# Patient Record
Sex: FEMALE | URBAN - METROPOLITAN AREA
[De-identification: names, ages, dates, MRNs, and addresses within clinical notes are randomized per-mention and may not be internally consistent; named-entity substitution may affect disease eponyms.]

---

## 2021-10-04 ENCOUNTER — OFFICE VISIT (OUTPATIENT)
Dept: URBAN - METROPOLITAN AREA CLINIC 82 | Facility: CLINIC | Age: 61
End: 2021-10-04

## 2022-08-30 ENCOUNTER — OFFICE VISIT (OUTPATIENT)
Dept: URBAN - METROPOLITAN AREA CLINIC 35 | Facility: CLINIC | Age: 62
End: 2022-08-30
Payer: SELF-PAY

## 2022-08-30 ENCOUNTER — LAB OUTSIDE AN ENCOUNTER (OUTPATIENT)
Dept: URBAN - METROPOLITAN AREA CLINIC 35 | Facility: CLINIC | Age: 62
End: 2022-08-30

## 2022-08-30 VITALS
DIASTOLIC BLOOD PRESSURE: 66 MMHG | WEIGHT: 165 LBS | HEART RATE: 67 BPM | BODY MASS INDEX: 30.36 KG/M2 | HEIGHT: 62 IN | SYSTOLIC BLOOD PRESSURE: 163 MMHG | OXYGEN SATURATION: 94 %

## 2022-08-30 DIAGNOSIS — Z12.11 COLON CANCER SCREENING: ICD-10-CM

## 2022-08-30 DIAGNOSIS — R10.13 EPIGASTRIC ABDOMINAL PAIN: ICD-10-CM

## 2022-08-30 DIAGNOSIS — R14.2 BELCHING SYMPTOM: ICD-10-CM

## 2022-08-30 DIAGNOSIS — R14.0 ABDOMINAL BLOATING: ICD-10-CM

## 2022-08-30 DIAGNOSIS — R12 HEARTBURN: ICD-10-CM

## 2022-08-30 PROCEDURE — 99204 OFFICE O/P NEW MOD 45 MIN: CPT | Performed by: INTERNAL MEDICINE

## 2022-08-30 RX ORDER — SODIUM, POTASSIUM,MAG SULFATES 17.5-3.13G
177 ML SOLUTION, RECONSTITUTED, ORAL ORAL BID
Qty: 354 ML | Refills: 0 | OUTPATIENT
Start: 2022-08-30 | End: 2022-08-31

## 2022-08-30 NOTE — HPI-TODAY'S VISIT:
62 year old female patient presents for epigastric pain, indigestion, gas and bloating. Patient admits epigastric pain can be described as "contractions". Pain started 1 year ago and the patient states that she has 1 episode per month. Patient admits any aggravating factors: she states the epigastric pain may be related to irregular eating schedule. Patient admits alleviating factor: eating, she states she feels relief after a meal. Patient has tried Cimetidine 200 mg  with relief of her symptoms. Patient admits the use of antibiotics within the last (3-6) months. She admits taking Azithromycin and Amoxicillin to treat cold symptoms.  GI MD: epigastric pain she thinks may be present for over 1 year. Bread, grains, red meat are some exacerbating factors. Pain though, is not always associated with food though. + heartburn No dysphagia No N/V Patient states approximately 3 months ago was found to have H Pylori on a breath test and treated with Amoxicillin and Clarithromycin. Eradication was confirmed, per patient's hx.

## 2022-08-30 NOTE — HPI-BLOATING/GAS
62 year old female patient presents for epigastric pain, indigestions, gas and bloating. Patient admits breads, pasta, red meats, and grains are aggravating factors. She admits acidic eruptions accompanied by frequent episodes of acid reflux. She admits taking over the counter anti-acid with mild relief. She admits modifying diet and eating lighter foods which have improved symptoms. Patient admits episodes happen very frequently.  GI MD:  increased belching and heartburn. Patient has  noticed when she is under stress, symptoms are magnified. No increased flatus. She has daily BM's. No constipation. No melena or visible blood in stool. No weight loss

## 2022-09-30 ENCOUNTER — TELEPHONE ENCOUNTER (OUTPATIENT)
Dept: URBAN - METROPOLITAN AREA CLINIC 6 | Facility: CLINIC | Age: 62
End: 2022-09-30

## 2022-09-30 RX ORDER — SODIUM, POTASSIUM,MAG SULFATES 17.5-3.13G
177 ML SOLUTION, RECONSTITUTED, ORAL ORAL BID
Qty: 354 ML | Refills: 0 | OUTPATIENT
Start: 2022-10-04 | End: 2022-10-08

## 2022-10-21 ENCOUNTER — OFFICE VISIT (OUTPATIENT)
Dept: URBAN - METROPOLITAN AREA MEDICAL CENTER 10 | Facility: MEDICAL CENTER | Age: 62
End: 2022-10-21

## 2022-10-21 ENCOUNTER — CLAIMS CREATED FROM THE CLAIM WINDOW (OUTPATIENT)
Dept: URBAN - METROPOLITAN AREA MEDICAL CENTER 10 | Facility: MEDICAL CENTER | Age: 62
End: 2022-10-21
Payer: SELF-PAY

## 2022-10-21 DIAGNOSIS — K63.89 BACTERIAL OVERGROWTH SYNDROME: ICD-10-CM

## 2022-10-21 DIAGNOSIS — K29.60 ADENOPAPILLOMATOSIS GASTRICA: ICD-10-CM

## 2022-10-21 DIAGNOSIS — Z12.11 COLON CANCER SCREENING: ICD-10-CM

## 2022-10-21 PROCEDURE — 43239 EGD BIOPSY SINGLE/MULTIPLE: CPT | Performed by: INTERNAL MEDICINE

## 2022-10-21 PROCEDURE — 45380 COLONOSCOPY AND BIOPSY: CPT | Performed by: INTERNAL MEDICINE

## 2022-11-08 ENCOUNTER — OFFICE VISIT (OUTPATIENT)
Dept: URBAN - METROPOLITAN AREA CLINIC 35 | Facility: CLINIC | Age: 62
End: 2022-11-08
Payer: SELF-PAY

## 2022-11-08 ENCOUNTER — DASHBOARD ENCOUNTERS (OUTPATIENT)
Age: 62
End: 2022-11-08

## 2022-11-08 VITALS
WEIGHT: 164 LBS | OXYGEN SATURATION: 97 % | DIASTOLIC BLOOD PRESSURE: 74 MMHG | SYSTOLIC BLOOD PRESSURE: 110 MMHG | BODY MASS INDEX: 30.18 KG/M2 | HEIGHT: 62 IN | HEART RATE: 65 BPM

## 2022-11-08 DIAGNOSIS — R14.0 ABDOMINAL BLOATING: ICD-10-CM

## 2022-11-08 DIAGNOSIS — Z12.11 COLON CANCER SCREENING: ICD-10-CM

## 2022-11-08 DIAGNOSIS — R10.13 EPIGASTRIC ABDOMINAL PAIN: ICD-10-CM

## 2022-11-08 DIAGNOSIS — R12 HEARTBURN: ICD-10-CM

## 2022-11-08 PROCEDURE — 99214 OFFICE O/P EST MOD 30 MIN: CPT | Performed by: INTERNAL MEDICINE

## 2022-11-08 RX ORDER — FAMOTIDINE 40 MG/1
1 TABLET AT BEDTIME TABLET, FILM COATED ORAL ONCE A DAY
Qty: 30 | Refills: 0 | OUTPATIENT
Start: 2022-11-08

## 2022-11-08 RX ORDER — PANTOPRAZOLE SODIUM 40 MG/1
1 TABLET TABLET, DELAYED RELEASE ORAL
Qty: 90 | Refills: 0 | OUTPATIENT
Start: 2022-11-08

## 2022-11-08 NOTE — HPI-TODAY'S VISIT:
62 Year old female patient presents today for Colonoscopy and EGD follow up. She denies any complication after procedure. Currently reports one bowel movements a day without strain. Stools are formed. Denies the presence of blood, mucus, nor melena. Denies dysphagia, globus, loss in apetite, sudden weight loss, change in bowel habits.  Colonoscopy Impression:  -The examined portion of the ileum was normal. -One 2 mm polyp in the sigmoid colon, removed with a cold biopsy forceps. Resected and retrieved. - Non-bleeding internal hemorrhoids.   Endoscopy Impressions:  -Z-line regular, 35 cm from the incisors. -Normal esophagus. -Erythematous mucosa in the antrum. Biopsied. -Gastric mucosal atrohpy. Biopsied. -Normal examined duodenum. Biopsied  Path: Duodenal bxs: NL Gastric: chronic gastritis. No atrophy. H Pylori immunostain negative Sigmoid colon polyp: HP   GI MD: patient is still having issues with heartburn. She is finding out several food items that bother like bread, flours, milk. No N/V NL BM's +bloating

## 2022-11-08 NOTE — PHYSICAL EXAM CHEST:
Refill Authorization Note   Anne Farmer  is requesting a refill authorization.  Brief Assessment and Rationale for Refill:  Approve     Medication Therapy Plan:       Medication Reconciliation Completed: No   Comments:   --->Care Gap information included below if applicable.       Requested Prescriptions   Pending Prescriptions Disp Refills    amLODIPine (NORVASC) 10 MG tablet [Pharmacy Med Name: AMLODIPINE BESYLATE 10 MG Tablet] 90 tablet 0     Sig: TAKE 1 TABLET EVERY DAY       Calcium-Channel Blockers Protocol Passed - 3/3/2022  5:38 AM        Passed - Patient is not currently pregnant        Passed - No positive pregnancy test in past 12 months         Passed - Visit with authorizing provider in past 12 months or upcoming 90 days         Passed - Blood Pressure below 139/89 on file in past 12 months      BP Readings from Last 3 Encounters:   12/01/21 138/65   11/01/21 (!) 140/90   07/29/21 (!) 145/70            Cardiovascular:  Calcium Channel Blockers Passed - 3/9/2022  9:21 PM        Passed - Patient is at least 18 years old        Passed - Last BP in normal range within 360 days     BP Readings from Last 1 Encounters:   12/01/21 138/65               Passed - Valid encounter within last 15 months     Recent Visits  Date Type Provider Dept   05/13/21 Office Visit Chiquita Talley MD [facility]   09/23/20 Office Visit Chiquita Talley MD [facility]   Showing recent visits within past 720 days and meeting all other requirements  Future Appointments  No visits were found meeting these conditions.  Showing future appointments within next 150 days and meeting all other requirements      Future Appointments              [facility]    In 3 months MD [facility] -  Rheumatology, [facility]                  pravastatin (PRAVACHOL) 20 MG tablet [Pharmacy Med Name: PRAVASTATIN SODIUM 20 MG Tablet] 90  chest wall non-tender, breathing is unlabored without accessory muscle use, normal breath sounds tablet 0     Sig: TAKE 1 TABLET EVERY DAY       Hmg CoA Reductase Inhibitors Protocol Passed - 3/3/2022  5:38 AM        Passed - Patient not currently pregnant        Passed - No positive pregnancy test in past 12 months         Passed - Recent or future visit with authorizing provider        Passed - Lipid Panel within past 12 months     Lab Results   Component Value Date    CHOL 198 05/13/2021    HDL 86 (H) 05/13/2021    LDLCALC 81.6 05/13/2021    TRIG 152 (H) 05/13/2021             Passed - ALT less than 95 in past 12 months      Lab Results   Component Value Date    ALT 24 12/01/2021    ALT 32 07/29/2021    ALT 31 05/13/2021             Cardiovascular:  Antilipid - Statins Passed - 3/9/2022  9:21 PM        Passed - Patient is at least 18 years old        Passed - Valid encounter within last 15 months     Recent Visits  Date Type Provider Dept   05/13/21 Office Visit Chiquita Talley MD [facility]   09/23/20 Office Visit Chiquita Talley MD [facility]   Showing recent visits within past 720 days and meeting all other requirements  Future Appointments  No visits were found meeting these conditions.  Showing future appointments within next 150 days and meeting all other requirements      Future Appointments              In 3 months LABORATORY, [facility] - Lab, [facility]    In 3 months Leon Suh MD [facility] -  Rheumatology, [facility]                Passed - ALT is 131 or below and within 360 days     ALT   Date Value Ref Range Status   12/01/2021 24 10 - 44 U/L Final   07/29/2021 32 10 - 44 U/L Final   05/13/2021 31 10 - 44 U/L Final              Passed - AST is 119 or below and within 360 days     AST   Date Value Ref Range Status   12/01/2021 35 10 - 40 U/L Final   07/29/2021 35 10 - 40 U/L Final   05/13/2021 40 10 - 40 U/L Final     Comment:     *Result may be interfered by visible hemolysis              Passed - Total Cholesterol within 360  days     Lab Results   Component Value Date    CHOL 198 05/13/2021    CHOL 209 (H) 09/24/2020    CHOL 204 (H) 06/07/2019              Passed - LDL within 360 days     LDL Cholesterol   Date Value Ref Range Status   05/13/2021 81.6 63.0 - 159.0 mg/dL Final     Comment:     The National Cholesterol Education Program (NCEP) has set the  following guidelines (reference values) for LDL Cholesterol:  Optimal.......................<130 mg/dL  Borderline High...............130-159 mg/dL  High..........................160-189 mg/dL  Very High.....................>190 mg/dL              Passed - HDL within 360 days     HDL   Date Value Ref Range Status   05/13/2021 86 (H) 40 - 75 mg/dL Final     Comment:     The National Cholesterol Education Program (NCEP) has set the  following guidelines (reference values) for HDL Cholesterol:  Low...............<40 mg/dL  Optimal...........>60 mg/dL              Passed - Triglycerides within 360 days     Lab Results   Component Value Date    TRIG 152 (H) 05/13/2021    TRIG 90 09/24/2020    TRIG 68 06/07/2019                  Appointments  past 12m or future 3m with PCP    Date Provider   Last Visit   5/13/2021 Chiquita Talley MD   Next Visit   3/8/2022 Chiquita Talley MD   ED visits in past 90 days: 0     Note composed:9:36 AM 03/10/2022

## 2023-01-13 ENCOUNTER — OFFICE VISIT (OUTPATIENT)
Dept: URBAN - METROPOLITAN AREA CLINIC 31 | Facility: CLINIC | Age: 63
End: 2023-01-13